# Patient Record
Sex: FEMALE | Race: BLACK OR AFRICAN AMERICAN | NOT HISPANIC OR LATINO | Employment: FULL TIME | URBAN - METROPOLITAN AREA
[De-identification: names, ages, dates, MRNs, and addresses within clinical notes are randomized per-mention and may not be internally consistent; named-entity substitution may affect disease eponyms.]

---

## 2017-01-06 ENCOUNTER — TELEPHONE (OUTPATIENT)
Dept: FAMILY MEDICINE | Facility: CLINIC | Age: 62
End: 2017-01-06

## 2017-01-06 DIAGNOSIS — E78.5 DYSLIPIDEMIA: ICD-10-CM

## 2017-01-06 DIAGNOSIS — I10 ESSENTIAL HYPERTENSION: Primary | ICD-10-CM

## 2017-01-06 NOTE — TELEPHONE ENCOUNTER
----- Message from Stephanie Bonilla sent at 1/6/2017  2:59 PM CST -----  Contact: self/[phone]  Patient would like her blood work orders put in they system for her appt on 02/07/17 and she wants a complete work up of thyroiod and liver enzymes.  Please advise

## 2017-01-06 NOTE — TELEPHONE ENCOUNTER
Pt phoned requesting to get labs done before appt, pt states she had a ct of her heart done with her card dr and her liver looks fatty, requesting liver enzymes as well

## 2017-01-24 RX ORDER — LOSARTAN POTASSIUM AND HYDROCHLOROTHIAZIDE 12.5; 5 MG/1; MG/1
TABLET ORAL
Qty: 90 TABLET | Refills: 1 | Status: SHIPPED | OUTPATIENT
Start: 2017-01-24 | End: 2017-07-01 | Stop reason: SDUPTHER

## 2017-02-06 ENCOUNTER — LAB VISIT (OUTPATIENT)
Dept: LAB | Facility: HOSPITAL | Age: 62
End: 2017-02-06
Attending: FAMILY MEDICINE
Payer: COMMERCIAL

## 2017-02-06 DIAGNOSIS — Z11.59 NEED FOR HEPATITIS C SCREENING TEST: ICD-10-CM

## 2017-02-06 DIAGNOSIS — E78.5 DYSLIPIDEMIA: ICD-10-CM

## 2017-02-06 DIAGNOSIS — I10 ESSENTIAL HYPERTENSION: ICD-10-CM

## 2017-02-06 LAB
ALBUMIN SERPL BCP-MCNC: 3.7 G/DL
ALP SERPL-CCNC: 73 U/L
ALT SERPL W/O P-5'-P-CCNC: 32 U/L
ANION GAP SERPL CALC-SCNC: 9 MMOL/L
AST SERPL-CCNC: 26 U/L
BASOPHILS # BLD AUTO: 0.01 K/UL
BASOPHILS NFR BLD: 0.2 %
BILIRUB SERPL-MCNC: 0.5 MG/DL
BUN SERPL-MCNC: 17 MG/DL
CALCIUM SERPL-MCNC: 9.7 MG/DL
CHLORIDE SERPL-SCNC: 106 MMOL/L
CHOLEST/HDLC SERPL: 4.8 {RATIO}
CO2 SERPL-SCNC: 27 MMOL/L
CREAT SERPL-MCNC: 0.9 MG/DL
DIFFERENTIAL METHOD: NORMAL
EOSINOPHIL # BLD AUTO: 0.3 K/UL
EOSINOPHIL NFR BLD: 4.5 %
ERYTHROCYTE [DISTWIDTH] IN BLOOD BY AUTOMATED COUNT: 14.1 %
EST. GFR  (AFRICAN AMERICAN): >60 ML/MIN/1.73 M^2
EST. GFR  (NON AFRICAN AMERICAN): >60 ML/MIN/1.73 M^2
GLUCOSE SERPL-MCNC: 101 MG/DL
HCT VFR BLD AUTO: 42.2 %
HDL/CHOLESTEROL RATIO: 20.9 %
HDLC SERPL-MCNC: 196 MG/DL
HDLC SERPL-MCNC: 41 MG/DL
HGB BLD-MCNC: 13.7 G/DL
LDLC SERPL CALC-MCNC: 125 MG/DL
LYMPHOCYTES # BLD AUTO: 1.4 K/UL
LYMPHOCYTES NFR BLD: 22.4 %
MCH RBC QN AUTO: 29.9 PG
MCHC RBC AUTO-ENTMCNC: 32.5 %
MCV RBC AUTO: 92 FL
MONOCYTES # BLD AUTO: 0.7 K/UL
MONOCYTES NFR BLD: 10.7 %
NEUTROPHILS # BLD AUTO: 3.9 K/UL
NEUTROPHILS NFR BLD: 62 %
NONHDLC SERPL-MCNC: 155 MG/DL
PLATELET # BLD AUTO: 250 K/UL
PMV BLD AUTO: 10.7 FL
POTASSIUM SERPL-SCNC: 4 MMOL/L
PROT SERPL-MCNC: 7.5 G/DL
RBC # BLD AUTO: 4.58 M/UL
SODIUM SERPL-SCNC: 142 MMOL/L
TRIGL SERPL-MCNC: 150 MG/DL
TSH SERPL DL<=0.005 MIU/L-ACNC: 0.99 UIU/ML
WBC # BLD AUTO: 6.29 K/UL

## 2017-02-06 PROCEDURE — 86803 HEPATITIS C AB TEST: CPT

## 2017-02-06 PROCEDURE — 36415 COLL VENOUS BLD VENIPUNCTURE: CPT | Mod: PO

## 2017-02-06 PROCEDURE — 80061 LIPID PANEL: CPT

## 2017-02-06 PROCEDURE — 85025 COMPLETE CBC W/AUTO DIFF WBC: CPT

## 2017-02-06 PROCEDURE — 84443 ASSAY THYROID STIM HORMONE: CPT

## 2017-02-06 PROCEDURE — 80053 COMPREHEN METABOLIC PANEL: CPT

## 2017-02-07 ENCOUNTER — OFFICE VISIT (OUTPATIENT)
Dept: FAMILY MEDICINE | Facility: CLINIC | Age: 62
End: 2017-02-07
Payer: COMMERCIAL

## 2017-02-07 ENCOUNTER — TELEPHONE (OUTPATIENT)
Dept: FAMILY MEDICINE | Facility: CLINIC | Age: 62
End: 2017-02-07

## 2017-02-07 VITALS
SYSTOLIC BLOOD PRESSURE: 120 MMHG | HEIGHT: 64 IN | HEART RATE: 60 BPM | WEIGHT: 205 LBS | DIASTOLIC BLOOD PRESSURE: 70 MMHG | BODY MASS INDEX: 35 KG/M2

## 2017-02-07 DIAGNOSIS — Z23 NEED FOR INFLUENZA VACCINATION: ICD-10-CM

## 2017-02-07 DIAGNOSIS — E66.01 SEVERE OBESITY (BMI 35.0-39.9): ICD-10-CM

## 2017-02-07 DIAGNOSIS — N39.0 URINARY TRACT INFECTION WITHOUT HEMATURIA, SITE UNSPECIFIED: ICD-10-CM

## 2017-02-07 DIAGNOSIS — K76.0 FATTY LIVER: ICD-10-CM

## 2017-02-07 DIAGNOSIS — I10 ESSENTIAL HYPERTENSION: Primary | ICD-10-CM

## 2017-02-07 DIAGNOSIS — Z23 NEED FOR DIPHTHERIA-TETANUS-PERTUSSIS (TDAP) VACCINE: ICD-10-CM

## 2017-02-07 DIAGNOSIS — N39.0 URINARY TRACT INFECTION WITHOUT HEMATURIA, SITE UNSPECIFIED: Primary | ICD-10-CM

## 2017-02-07 LAB — HCV AB SERPL QL IA: NEGATIVE

## 2017-02-07 PROCEDURE — 90472 IMMUNIZATION ADMIN EACH ADD: CPT | Mod: S$GLB,,, | Performed by: FAMILY MEDICINE

## 2017-02-07 PROCEDURE — 90471 IMMUNIZATION ADMIN: CPT | Mod: S$GLB,,, | Performed by: FAMILY MEDICINE

## 2017-02-07 PROCEDURE — 3074F SYST BP LT 130 MM HG: CPT | Mod: S$GLB,,, | Performed by: FAMILY MEDICINE

## 2017-02-07 PROCEDURE — 90688 IIV4 VACCINE SPLT 0.5 ML IM: CPT | Mod: S$GLB,,, | Performed by: FAMILY MEDICINE

## 2017-02-07 PROCEDURE — 90715 TDAP VACCINE 7 YRS/> IM: CPT | Mod: S$GLB,,, | Performed by: FAMILY MEDICINE

## 2017-02-07 PROCEDURE — 3078F DIAST BP <80 MM HG: CPT | Mod: S$GLB,,, | Performed by: FAMILY MEDICINE

## 2017-02-07 PROCEDURE — 99214 OFFICE O/P EST MOD 30 MIN: CPT | Mod: 25,S$GLB,, | Performed by: FAMILY MEDICINE

## 2017-02-07 PROCEDURE — 99999 PR PBB SHADOW E&M-EST. PATIENT-LVL III: CPT | Mod: PBBFAC,,, | Performed by: FAMILY MEDICINE

## 2017-02-07 RX ORDER — SULFAMETHOXAZOLE AND TRIMETHOPRIM 800; 160 MG/1; MG/1
1 TABLET ORAL 2 TIMES DAILY
Qty: 10 TABLET | Refills: 0 | Status: SHIPPED | OUTPATIENT
Start: 2017-02-07 | End: 2017-02-07

## 2017-02-07 NOTE — PATIENT INSTRUCTIONS
Aerobic Exercise for a Healthy Heart  Exercise is a lot more than an energy booster and a stress reliever. It also strengthens your heart muscle, lowers your blood pressure and cholesterol, and burns calories. It can also improve your resting muscle tone, and your mood.     Remember, some activity is better than none.    Choose an aerobic activity  Choose an activity that makes your heart and lungs work harder than they do when you rest or walk normally. This aerobic exercise can improve the way your heart and other muscles use oxygen. Make it fun by exercising with a friend and choosing an activity you enjoy. Here are some ideas:  · Walking  · Swimming  · Bicycling  · Stair climbing  · Dancing  · Jogging  · Gardening  Exercise regularly  If you havent been exercising regularly,  get your doctors OK first. Then start slowly.  Here are some tips:  · Begin exercising 3 times a week for 5 to 10 minutes at a time.  · When you feel comfortable, add a few minutes each session.  · Slowly build up to exercising 3 to 4 times each week. Each session should last for 40 minutes, on average, and involve moderate- to vigorous-intensity physical activity.  · If you have been given nitroglycerin, be sure to carry it when you exercise.  · If you get chest pain (angina) when youre exercising, stop what youre doing, take your nitroglycerin, and call your doctor.  Date Last Reviewed: 6/2/2016 © 2000-2016 [employer]. [address] [place] [address], [place] [address]. All rights reserved. This information is not intended as a substitute for professional medical care. Always follow your healthcare professional's instructions.      Bandaid applied, tolerated well, pt instructed to wait 15 minutes.

## 2017-02-07 NOTE — MR AVS SNAPSHOT
[facility]   [place]  [place] [address]  Phone: [phone]  Fax: [phone]                  Sharmin Alaniz   2017 3:00 PM   Office Visit    Description:  Female : 1955   Provider:  Jean-Paul Whitfield MD   Department:  [facility]           Reason for Visit     Annual Exam           Diagnoses this Visit        Comments    Essential hypertension    -  Primary     Fatty liver         Severe obesity (BMI 35.0-39.9)         Urinary tract infection without hematuria, site unspecified         Need for diphtheria-tetanus-pertussis (Tdap) vaccine         Need for influenza vaccination                To Do List           Goals (5 Years of Data)     None      Follow-Up and Disposition     Return in about 6 months (around 2017), or if symptoms worsen or fail to improve.      [facility] On Call     [facility] On Call Nurse Care Line -  Assistance  Registered nurses in the [facility] On Call Center provide clinical advisement, health education, appointment booking, and other advisory services.  Call for this free service at [phone].             Medications           Message regarding Medications     Verify the changes and/or additions to your medication regime listed below are the same as discussed with your clinician today.  If any of these changes or additions are incorrect, please notify your healthcare provider.        STOP taking these medications     sulfamethoxazole-trimethoprim 800-160mg (BACTRIM DS) 800-160 mg Tab Take 1 tablet by mouth 2 (two) times daily.           Verify that the below list of medications is an accurate representation of the medications you are currently taking.  If none reported, the list may be blank. If incorrect, please contact your healthcare provider. Carry this list with you in case of emergency.           Current Medications     cetirizine (ZYRTEC) 10 MG tablet Take 1 tablet (10 mg total) by mouth once daily.     "conjugated estrogens (PREMARIN) vaginal cream Place vaginally once daily.      fluticasone (FLONASE) 50 mcg/actuation nasal spray 2 sprays by Each Nare route daily as needed.    losartan-hydrochlorothiazide 50-12.5 mg (HYZAAR) 50-12.5 mg per tablet TAKE 1 TABLET BY MOUTH ONCE DAILY.    OMEPRAZOLE/SODIUM BICARBONATE (ZEGERID ORAL) Take by mouth.           Clinical Reference Information           Your Vitals Were     BP Pulse Height Weight BMI    120/70 (BP Location: Right arm, Patient Position: Sitting, BP Method: Manual) 60 5' 4" (1.626 m) 93 kg (205 lb 0.4 oz) 35.19 kg/m2      Blood Pressure          Most Recent Value    BP  120/70      Allergies as of 2/7/2017     Ciprofloxacin      Immunizations Administered on Date of Encounter - 2/7/2017     Name Date Dose VIS Date Route    TDAP  Incomplete 0.5 mL 2/24/2015 Intramuscular    influenza - Quadrivalent  Incomplete 0.5 mL 8/7/2015 Intramuscular      Orders Placed During Today's Visit      Normal Orders This Visit    Influenza - Quadrivalent (3 years & older)     Tdap Vaccine (Adult)       Instructions      Aerobic Exercise for a Healthy Heart  Exercise is a lot more than an energy booster and a stress reliever. It also strengthens your heart muscle, lowers your blood pressure and cholesterol, and burns calories. It can also improve your resting muscle tone, and your mood.     Remember, some activity is better than none.    Choose an aerobic activity  Choose an activity that makes your heart and lungs work harder than they do when you rest or walk normally. This aerobic exercise can improve the way your heart and other muscles use oxygen. Make it fun by exercising with a friend and choosing an activity you enjoy. Here are some ideas:  · Walking  · Swimming  · Bicycling  · Stair climbing  · Dancing  · Jogging  · Gardening  Exercise regularly  If you havent been exercising regularly,  get your doctors OK first. Then start slowly.  Here are some tips:  · Begin " exercising 3 times a week for 5 to 10 minutes at a time.  · When you feel comfortable, add a few minutes each session.  · Slowly build up to exercising 3 to 4 times each week. Each session should last for 40 minutes, on average, and involve moderate- to vigorous-intensity physical activity.  · If you have been given nitroglycerin, be sure to carry it when you exercise.  · If you get chest pain (angina) when youre exercising, stop what youre doing, take your nitroglycerin, and call your doctor.  Date Last Reviewed: 6/2/2016  © 8237-1188 [employer]. [address] [place] [address], [place] [address]. All rights reserved. This information is not intended as a substitute for professional medical care. Always follow your healthcare professional's instructions.             Language Assistance Services     ATTENTION: Language assistance services are available, free of charge. Please call [phone].      ATENCIÓN: Si habla myron, tiene a romero disposición servicios gratuitos de asistencia lingüística. Llame al [phone].     MEE Ý: N?u b?n nói Ti?ng Vi?t, có các d?ch v? h? tr? ngôn ng? mi?n phí dành cho b?n. G?i s? [phone].         [facility] complies with applicable Federal civil rights laws and does not discriminate on the basis of race, color, national origin, age, disability, or sex.

## 2017-02-07 NOTE — PROGRESS NOTES
Subjective:       Patient ID: Sharmin Alaniz is a 61 y.o. female.    Chief Complaint: Annual Exam    HPI Comments: 61 years old who came to the clinic for blood pressure check.  Blood pressure today is stable.  No chest pain palpitations orthopnea or PND.  Patient was recently diagnosed with fatty liver.  Patient is obese currently trying to lose weight.  Patient BMI 35.  Urine with evidence of acute UTI.    Review of Systems   Constitutional: Negative.  Negative for chills and fever.   HENT: Negative.    Eyes: Negative.    Respiratory: Negative.    Cardiovascular: Negative.  Negative for chest pain, palpitations and leg swelling.   Gastrointestinal: Negative.    Genitourinary: Negative.  Negative for dysuria, flank pain, frequency, hematuria and urgency.   Musculoskeletal: Negative.    Skin: Negative.    Neurological: Negative.    Psychiatric/Behavioral: Negative.        Objective:      Physical Exam   Constitutional: She is oriented to person, place, and time. She appears well-developed and well-nourished. No distress.   HENT:   Head: Normocephalic and atraumatic.   Right Ear: External ear normal.   Left Ear: External ear normal.   Nose: Nose normal.   Mouth/Throat: Oropharynx is clear and moist. No oropharyngeal exudate.   Eyes: Conjunctivae and EOM are normal. Pupils are equal, round, and reactive to light. Right eye exhibits no discharge. Left eye exhibits no discharge. No scleral icterus.   Neck: Normal range of motion. Neck supple. No JVD present. No tracheal deviation present. No thyromegaly present.   Cardiovascular: Normal rate, regular rhythm, normal heart sounds and intact distal pulses.  Exam reveals no gallop and no friction rub.    No murmur heard.  Pulmonary/Chest: Effort normal and breath sounds normal. No stridor. No respiratory distress. She has no wheezes. She has no rales. She exhibits no tenderness.   Abdominal: Soft. Bowel sounds are normal. She exhibits no distension and no mass. There is no  tenderness. There is no rebound and no guarding.   Musculoskeletal: Normal range of motion. She exhibits no edema or tenderness.   Lymphadenopathy:     She has no cervical adenopathy.   Neurological: She is alert and oriented to person, place, and time. She has normal reflexes. No cranial nerve deficit. She exhibits normal muscle tone. Coordination normal.   Skin: Skin is warm and dry. No rash noted. She is not diaphoretic. No erythema. No pallor.   Psychiatric: She has a normal mood and affect. Her behavior is normal. Judgment and thought content normal.       Assessment:       1. Essential hypertension    2. Fatty liver    3. Severe obesity (BMI 35.0-39.9)    4. Urinary tract infection without hematuria, site unspecified    5. Need for diphtheria-tetanus-pertussis (Tdap) vaccine    6. Need for influenza vaccination        Plan:         Sharmin was seen today for annual exam.    Diagnoses and all orders for this visit:    Essential hypertension    Fatty liver    Severe obesity (BMI 35.0-39.9)    Urinary tract infection without hematuria, site unspecified    Need for diphtheria-tetanus-pertussis (Tdap) vaccine  -     Tdap Vaccine (Adult)    Need for influenza vaccination  -     Influenza - Quadrivalent (3 years & older)    Continue monitoring blood pressure at home, low sodium diet.   Diet and physical activity to promote weight loss.

## 2017-05-11 ENCOUNTER — OFFICE VISIT (OUTPATIENT)
Dept: OPTOMETRY | Facility: CLINIC | Age: 62
End: 2017-05-11
Payer: COMMERCIAL

## 2017-05-11 DIAGNOSIS — H25.13 NUCLEAR SCLEROSIS, BILATERAL: Primary | ICD-10-CM

## 2017-05-11 DIAGNOSIS — Z46.0 FITTING AND ADJUSTMENT OF SPECTACLES AND CONTACT LENSES: Primary | ICD-10-CM

## 2017-05-11 PROCEDURE — 92310 CONTACT LENS FITTING OU: CPT | Mod: ,,, | Performed by: OPTOMETRIST

## 2017-05-11 PROCEDURE — 92014 COMPRE OPH EXAM EST PT 1/>: CPT | Mod: S$GLB,,, | Performed by: OPTOMETRIST

## 2017-05-11 PROCEDURE — 99999 PR PBB SHADOW E&M-EST. PATIENT-LVL II: CPT | Mod: PBBFAC,,, | Performed by: OPTOMETRIST

## 2017-05-11 PROCEDURE — 92015 DETERMINE REFRACTIVE STATE: CPT | Mod: S$GLB,,, | Performed by: OPTOMETRIST

## 2017-05-11 PROCEDURE — 99999 PR PBB SHADOW E&M-EST. PATIENT-LVL I: CPT | Mod: PBBFAC,,, | Performed by: OPTOMETRIST

## 2017-05-11 NOTE — PROGRESS NOTES
HPI     DLS: 3/2/2016  Pt states no vision changes since last visit. +Dry eyes  +Itching   Denies f/f    Pt states she is interested in trying CL.     Refresh ou PRN         Last edited by Lore Hodgson on 5/11/2017  2:59 PM.     ROS     Positive for: Eyes    Negative for: Constitutional, Gastrointestinal, Neurological, Skin,   Genitourinary, Musculoskeletal, HENT, Endocrine, Cardiovascular,   Respiratory, Psychiatric, Allergic/Imm, Heme/Lymph    Last edited by Cm Lehman, OD on 5/11/2017  3:10 PM. (History)        Assessment /Plan     For exam results, see Encounter Report.    Nuclear sclerosis, bilateral      1. Small Cats OU  2. Pt wore OASYS ASTIG CLs 5 years ago--gave up due to dryness.  Wishes to try again.  Will try diff brand to see if more comfort.  Good fit w OASYS ASTIG (did not have soria in stock pt needs)    PLAN:    1. Sent pt to optical to order TRIALS OASYS ASTIG monovision (OS NEAR).  i do NOT need to see at disp  2. Build up wearing time.  Use frequent REFRESH ATs  3. Continue Daily Wear schedule.  NO SLEEPING IN CONTACT LENSES. Clean and disinfect nightly.  exchange monthly.    4. If no problems will call for CLRx, rtc 1 yr

## 2017-07-03 RX ORDER — LOSARTAN POTASSIUM AND HYDROCHLOROTHIAZIDE 12.5; 5 MG/1; MG/1
TABLET ORAL
Qty: 90 TABLET | Refills: 1 | Status: SHIPPED | OUTPATIENT
Start: 2017-07-03 | End: 2017-12-09 | Stop reason: SDUPTHER

## 2017-12-11 RX ORDER — LOSARTAN POTASSIUM AND HYDROCHLOROTHIAZIDE 12.5; 5 MG/1; MG/1
TABLET ORAL
Qty: 90 TABLET | Refills: 1 | Status: SHIPPED | OUTPATIENT
Start: 2017-12-11 | End: 2018-05-24 | Stop reason: SDUPTHER

## 2017-12-20 ENCOUNTER — PATIENT MESSAGE (OUTPATIENT)
Dept: ADMINISTRATIVE | Facility: OTHER | Age: 62
End: 2017-12-20

## 2018-03-02 ENCOUNTER — TELEPHONE (OUTPATIENT)
Dept: FAMILY MEDICINE | Facility: CLINIC | Age: 63
End: 2018-03-02

## 2018-03-02 DIAGNOSIS — E66.01 SEVERE OBESITY (BMI 35.0-39.9): ICD-10-CM

## 2018-03-02 DIAGNOSIS — I10 ESSENTIAL HYPERTENSION: Primary | ICD-10-CM

## 2018-03-02 NOTE — TELEPHONE ENCOUNTER
----- Message from Rocio Aggarwal sent at 3/2/2018  8:51 AM CST -----  Contact: Self. [phone]  Patient would like blood work orders put in the system. Please advise.

## 2018-03-10 ENCOUNTER — LAB VISIT (OUTPATIENT)
Dept: LAB | Facility: HOSPITAL | Age: 63
End: 2018-03-10
Attending: FAMILY MEDICINE
Payer: COMMERCIAL

## 2018-03-10 DIAGNOSIS — E66.01 SEVERE OBESITY (BMI 35.0-39.9): ICD-10-CM

## 2018-03-10 DIAGNOSIS — I10 ESSENTIAL HYPERTENSION: ICD-10-CM

## 2018-03-10 LAB
ALBUMIN SERPL BCP-MCNC: 3.8 G/DL
ALP SERPL-CCNC: 70 U/L
ALT SERPL W/O P-5'-P-CCNC: 29 U/L
ANION GAP SERPL CALC-SCNC: 9 MMOL/L
AST SERPL-CCNC: 23 U/L
BASOPHILS # BLD AUTO: 0.02 K/UL
BASOPHILS NFR BLD: 0.4 %
BILIRUB SERPL-MCNC: 0.6 MG/DL
BUN SERPL-MCNC: 22 MG/DL
CALCIUM SERPL-MCNC: 10.2 MG/DL
CHLORIDE SERPL-SCNC: 106 MMOL/L
CHOLEST SERPL-MCNC: 211 MG/DL
CHOLEST/HDLC SERPL: 4.4 {RATIO}
CO2 SERPL-SCNC: 28 MMOL/L
CREAT SERPL-MCNC: 0.9 MG/DL
DIFFERENTIAL METHOD: ABNORMAL
EOSINOPHIL # BLD AUTO: 0.3 K/UL
EOSINOPHIL NFR BLD: 4.8 %
ERYTHROCYTE [DISTWIDTH] IN BLOOD BY AUTOMATED COUNT: 13.5 %
EST. GFR  (AFRICAN AMERICAN): >60 ML/MIN/1.73 M^2
EST. GFR  (NON AFRICAN AMERICAN): >60 ML/MIN/1.73 M^2
GLUCOSE SERPL-MCNC: 102 MG/DL
HCT VFR BLD AUTO: 42.6 %
HDLC SERPL-MCNC: 48 MG/DL
HDLC SERPL: 22.7 %
HGB BLD-MCNC: 13.5 G/DL
IMM GRANULOCYTES # BLD AUTO: 0.03 K/UL
IMM GRANULOCYTES NFR BLD AUTO: 0.6 %
LDLC SERPL CALC-MCNC: 139.6 MG/DL
LYMPHOCYTES # BLD AUTO: 1.1 K/UL
LYMPHOCYTES NFR BLD: 20.6 %
MCH RBC QN AUTO: 29.2 PG
MCHC RBC AUTO-ENTMCNC: 31.7 G/DL
MCV RBC AUTO: 92 FL
MONOCYTES # BLD AUTO: 0.5 K/UL
MONOCYTES NFR BLD: 9.7 %
NEUTROPHILS # BLD AUTO: 3.4 K/UL
NEUTROPHILS NFR BLD: 63.9 %
NONHDLC SERPL-MCNC: 163 MG/DL
NRBC BLD-RTO: 0 /100 WBC
PLATELET # BLD AUTO: 249 K/UL
PMV BLD AUTO: 10.3 FL
POTASSIUM SERPL-SCNC: 4.2 MMOL/L
PROT SERPL-MCNC: 7.4 G/DL
RBC # BLD AUTO: 4.63 M/UL
SODIUM SERPL-SCNC: 143 MMOL/L
TRIGL SERPL-MCNC: 117 MG/DL
TSH SERPL DL<=0.005 MIU/L-ACNC: 0.74 UIU/ML
WBC # BLD AUTO: 5.25 K/UL

## 2018-03-10 PROCEDURE — 84443 ASSAY THYROID STIM HORMONE: CPT

## 2018-03-10 PROCEDURE — 80053 COMPREHEN METABOLIC PANEL: CPT

## 2018-03-10 PROCEDURE — 85025 COMPLETE CBC W/AUTO DIFF WBC: CPT

## 2018-03-10 PROCEDURE — 36415 COLL VENOUS BLD VENIPUNCTURE: CPT | Mod: PO

## 2018-03-10 PROCEDURE — 80061 LIPID PANEL: CPT

## 2018-03-12 ENCOUNTER — TELEPHONE (OUTPATIENT)
Dept: FAMILY MEDICINE | Facility: CLINIC | Age: 63
End: 2018-03-12

## 2018-03-12 DIAGNOSIS — N39.0 URINARY TRACT INFECTION WITHOUT HEMATURIA, SITE UNSPECIFIED: Primary | ICD-10-CM

## 2018-03-12 RX ORDER — SULFAMETHOXAZOLE AND TRIMETHOPRIM 800; 160 MG/1; MG/1
1 TABLET ORAL 2 TIMES DAILY
Qty: 6 TABLET | Refills: 0 | Status: SHIPPED | OUTPATIENT
Start: 2018-03-12 | End: 2018-03-15

## 2018-03-13 ENCOUNTER — OFFICE VISIT (OUTPATIENT)
Dept: FAMILY MEDICINE | Facility: CLINIC | Age: 63
End: 2018-03-13
Payer: COMMERCIAL

## 2018-03-13 VITALS
HEART RATE: 58 BPM | WEIGHT: 205 LBS | HEIGHT: 64 IN | DIASTOLIC BLOOD PRESSURE: 68 MMHG | SYSTOLIC BLOOD PRESSURE: 120 MMHG | BODY MASS INDEX: 35 KG/M2

## 2018-03-13 DIAGNOSIS — E78.5 DYSLIPIDEMIA: ICD-10-CM

## 2018-03-13 DIAGNOSIS — E66.01 SEVERE OBESITY (BMI 35.0-39.9): ICD-10-CM

## 2018-03-13 DIAGNOSIS — I10 ESSENTIAL HYPERTENSION: Primary | ICD-10-CM

## 2018-03-13 PROCEDURE — 99214 OFFICE O/P EST MOD 30 MIN: CPT | Mod: S$GLB,,, | Performed by: FAMILY MEDICINE

## 2018-03-13 PROCEDURE — 99999 PR PBB SHADOW E&M-EST. PATIENT-LVL III: CPT | Mod: PBBFAC,,, | Performed by: FAMILY MEDICINE

## 2018-03-13 PROCEDURE — 3074F SYST BP LT 130 MM HG: CPT | Mod: CPTII,S$GLB,, | Performed by: FAMILY MEDICINE

## 2018-03-13 PROCEDURE — 3078F DIAST BP <80 MM HG: CPT | Mod: CPTII,S$GLB,, | Performed by: FAMILY MEDICINE

## 2018-03-13 NOTE — PROGRESS NOTES
Subjective:       Patient ID: Sharmin Alaniz is a 62 y.o. female.    Chief Complaint: Annual Exam    62 years old female who came to the clinic for her physical examination.  Patient was slightly elevated cholesterol.  No chest pain palpitations orthopnea or PND.  Blood pressure today stable.  Patient with a BMI of 35 currently trying to lose weight.      Review of Systems   Constitutional: Negative.  Negative for activity change and unexpected weight change.   HENT: Negative.  Negative for hearing loss, rhinorrhea and trouble swallowing.    Eyes: Negative.  Negative for discharge and visual disturbance.   Respiratory: Negative.  Negative for chest tightness and wheezing.    Cardiovascular: Negative.  Negative for chest pain and palpitations.   Gastrointestinal: Negative.  Negative for blood in stool, constipation, diarrhea and vomiting.   Endocrine: Negative for polydipsia and polyuria.   Genitourinary: Negative.  Negative for difficulty urinating, dysuria, hematuria and menstrual problem.   Musculoskeletal: Negative.  Negative for arthralgias, joint swelling and neck pain.   Skin: Negative.    Neurological: Negative.  Negative for weakness and headaches.   Psychiatric/Behavioral: Negative.  Negative for confusion and dysphoric mood.       Objective:      Physical Exam   Constitutional: She is oriented to person, place, and time. She appears well-developed and well-nourished. No distress.   HENT:   Head: Normocephalic and atraumatic.   Right Ear: External ear normal.   Left Ear: External ear normal.   Nose: Nose normal.   Mouth/Throat: Oropharynx is clear and moist. No oropharyngeal exudate.   Eyes: Conjunctivae and EOM are normal. Pupils are equal, round, and reactive to light. Right eye exhibits no discharge. Left eye exhibits no discharge. No scleral icterus.   Neck: Normal range of motion. Neck supple. No JVD present. No tracheal deviation present. No thyromegaly present.   Cardiovascular: Normal rate, regular  rhythm, normal heart sounds and intact distal pulses.  Exam reveals no gallop and no friction rub.    No murmur heard.  Pulmonary/Chest: Effort normal and breath sounds normal. No stridor. No respiratory distress. She has no wheezes. She has no rales. She exhibits no tenderness.   Abdominal: Soft. Bowel sounds are normal. She exhibits no distension and no mass. There is no tenderness. There is no rebound and no guarding.   Musculoskeletal: Normal range of motion. She exhibits no edema or tenderness.   Lymphadenopathy:     She has no cervical adenopathy.   Neurological: She is alert and oriented to person, place, and time. She has normal reflexes. No cranial nerve deficit. She exhibits normal muscle tone. Coordination normal.   Skin: Skin is warm and dry. No rash noted. She is not diaphoretic. No erythema. No pallor.   Psychiatric: She has a normal mood and affect. Her behavior is normal. Judgment and thought content normal.       Assessment:       1. Essential hypertension    2. Severe obesity (BMI 35.0-39.9)    3. Dyslipidemia        Plan:         Sharmin was seen today for annual exam.    Diagnoses and all orders for this visit:    Essential hypertension  -     Comprehensive metabolic panel; Future  -     Lipid panel; Future  -     Ambulatory Referral to [facility] ([place])  -     [facility] ASSIGN QUESTIONNAIRE SERIES ([place])  -     [facility] ([place]    Severe obesity (BMI 35.0-39.9)  -     Lipid panel; Future  -     Ambulatory Referral to [facility] ([place])  -     [facility] ASSIGN QUESTIONNAIRE SERIES ([place])  -     [facility] ([place])    Dyslipidemia  -     Comprehensive metabolic panel; Future  -     Lipid panel; Future  -     Ambulatory Referral to [facility] ([place])  -     [facility] IsofluxHART ASSIGN QUESTIONNAIRE SERIES ([place])  -     [facility] ([place])    Continue monitoring blood pressure at home, low  sodium diet.   Diet and physical activity to promote weight loss.

## 2018-03-13 NOTE — PATIENT INSTRUCTIONS
"  Understanding Body Mass Index (BMI)  Body mass index (BMI) is a method of screening for a weight category using the ratio of your height to your weight. The BMI is a measure of overweight that is corrected for height. Knowing your BMI is a way to tell if you are at a healthy weight. The higher your BMI, the greater your risk for weight-related health problems.  What BMI means  · BMI below 18.5: Underweight  · BMI 18.5 to 24.9: Healthy weight or "ideal body weight"   · BMI 25 to 29.9: Overweight  · BMI 30 and over: Obese  · BMI 40 and over: Severe obesity   Online BMI Calculators  Find your BMI with an online BMI calculator tool, such as these from the CDC:  · BMI calculator for adults  · BMI calculator for children and teens   Using the BMI chart  To figure out your BMI, find your height and weight (or the numbers closest to them) on the table below. Follow each column of numbers to where your height and weight meet on the table. That is your BMI.    Date Last Reviewed: 7/1/2016  © 8426-6292 [employer]. [address] [place] [address], [place] [address]. All rights reserved. This information is not intended as a substitute for professional medical care. Always follow your healthcare professional's instructions.        "

## 2018-03-15 ENCOUNTER — TELEPHONE (OUTPATIENT)
Dept: FAMILY MEDICINE | Facility: CLINIC | Age: 63
End: 2018-03-15

## 2018-03-15 DIAGNOSIS — R22.2 SUPRACLAVICULAR MASS: Primary | ICD-10-CM

## 2018-03-15 NOTE — TELEPHONE ENCOUNTER
Patient concern about her mass on her neck that spoke with Dr France on her last visit.  She will like to have further testing for this.  Please advise.

## 2018-03-15 NOTE — TELEPHONE ENCOUNTER
Spoke with patient about CT neck was ordered by Dr Fracne.  A referral coordinator will be calling patient to set appointment for this test.  Patient verbalized understanding on instruction given.

## 2018-03-15 NOTE — TELEPHONE ENCOUNTER
Patient with soft tissue supraclavicular mass on the left side.  Imaging study was ordered.    Please contact the patient with appointment.

## 2018-03-15 NOTE — TELEPHONE ENCOUNTER
----- Message from Misbah Bentley sent at 3/15/2018  8:42 AM CDT -----  Contact: [phone] self  Patient requested to speak with the nurse about the soft tissue mass on her neck. Please call and advise.

## 2018-03-23 ENCOUNTER — TELEPHONE (OUTPATIENT)
Dept: FAMILY MEDICINE | Facility: CLINIC | Age: 63
End: 2018-03-23

## 2018-03-23 DIAGNOSIS — R22.1 NECK MASS: ICD-10-CM

## 2018-03-27 ENCOUNTER — HOSPITAL ENCOUNTER (OUTPATIENT)
Dept: RADIOLOGY | Facility: HOSPITAL | Age: 63
Discharge: HOME | End: 2018-03-27
Attending: FAMILY MEDICINE
Payer: COMMERCIAL

## 2018-03-27 DIAGNOSIS — R22.1 NECK MASS: ICD-10-CM

## 2018-03-27 DIAGNOSIS — R22.2 SUPRACLAVICULAR MASS: ICD-10-CM

## 2018-03-27 PROCEDURE — 25500020 PHARM REV CODE 255: Mod: PO | Performed by: FAMILY MEDICINE

## 2018-03-27 PROCEDURE — 70491 CT SOFT TISSUE NECK W/DYE: CPT | Mod: TC,PO

## 2018-03-27 PROCEDURE — 71260 CT THORAX DX C+: CPT | Mod: TC,PO

## 2018-03-27 RX ADMIN — IOHEXOL 50 ML: 350 INJECTION, SOLUTION INTRAVENOUS at 08:03

## 2018-04-21 ENCOUNTER — OFFICE VISIT (OUTPATIENT)
Dept: URGENT CARE | Facility: CLINIC | Age: 63
End: 2018-04-21
Payer: COMMERCIAL

## 2018-04-21 VITALS
SYSTOLIC BLOOD PRESSURE: 129 MMHG | BODY MASS INDEX: 35 KG/M2 | WEIGHT: 205 LBS | TEMPERATURE: 99 F | DIASTOLIC BLOOD PRESSURE: 70 MMHG | HEART RATE: 89 BPM | OXYGEN SATURATION: 97 % | RESPIRATION RATE: 18 BRPM | HEIGHT: 64 IN

## 2018-04-21 DIAGNOSIS — J32.9 SINUSITIS, UNSPECIFIED CHRONICITY, UNSPECIFIED LOCATION: Primary | ICD-10-CM

## 2018-04-21 DIAGNOSIS — J30.9 CHRONIC ALLERGIC RHINITIS, UNSPECIFIED SEASONALITY, UNSPECIFIED TRIGGER: ICD-10-CM

## 2018-04-21 PROCEDURE — 99214 OFFICE O/P EST MOD 30 MIN: CPT | Mod: 25,S$GLB,, | Performed by: FAMILY MEDICINE

## 2018-04-21 PROCEDURE — 3078F DIAST BP <80 MM HG: CPT | Mod: CPTII,S$GLB,, | Performed by: FAMILY MEDICINE

## 2018-04-21 PROCEDURE — 96372 THER/PROPH/DIAG INJ SC/IM: CPT | Mod: S$GLB,,, | Performed by: FAMILY MEDICINE

## 2018-04-21 PROCEDURE — 3074F SYST BP LT 130 MM HG: CPT | Mod: CPTII,S$GLB,, | Performed by: FAMILY MEDICINE

## 2018-04-21 RX ORDER — FLUTICASONE PROPIONATE 50 MCG
2 SPRAY, SUSPENSION (ML) NASAL DAILY
Qty: 1 BOTTLE | Refills: 8 | Status: SHIPPED | OUTPATIENT
Start: 2018-04-21

## 2018-04-21 RX ORDER — AMOXICILLIN AND CLAVULANATE POTASSIUM 875; 125 MG/1; MG/1
1 TABLET, FILM COATED ORAL 2 TIMES DAILY
Qty: 20 TABLET | Refills: 0 | Status: SHIPPED | OUTPATIENT
Start: 2018-04-21 | End: 2018-05-01

## 2018-04-21 RX ORDER — BETAMETHASONE SODIUM PHOSPHATE AND BETAMETHASONE ACETATE 3; 3 MG/ML; MG/ML
6 INJECTION, SUSPENSION INTRA-ARTICULAR; INTRALESIONAL; INTRAMUSCULAR; SOFT TISSUE ONCE
Status: COMPLETED | OUTPATIENT
Start: 2018-04-21 | End: 2018-04-21

## 2018-04-21 RX ADMIN — BETAMETHASONE SODIUM PHOSPHATE AND BETAMETHASONE ACETATE 6 MG: 3; 3 INJECTION, SUSPENSION INTRA-ARTICULAR; INTRALESIONAL; INTRAMUSCULAR; SOFT TISSUE at 04:04

## 2018-04-21 NOTE — PATIENT INSTRUCTIONS
Understanding Nasal Allergies  Nasal allergies (also called allergic rhinitis) are a common health problem. They may be seasonal. This means they cause symptoms only at certain times of the year. Or they may be perennial. This means they cause symptoms all year long. Other health problems, such as asthma, often occur along with allergies as well.    What is an allergic reaction?  An allergy is a reaction to a substance called an allergen. Common allergens include:  · Wind-borne pollen  · Mold  · Dust mites  · Furry and feathered animals  · Cockroaches  Normally, allergens are harmless. But when a person has allergies, the body thinks they are harmful. The body then attacks allergens with antibodies. Antibodies are attached to special cells called mast cells. Allergens stick to the antibodies. This makes the mast cells release histamine and other chemicals. This is an allergic reaction. The chemicals irritate nearby nasal tissue. This causes nasal allergy symptoms.  Common nasal allergy symptoms  Allergies can cause nasal tissue to swell. This makes the air passages smaller. The nose may feel stuffed up. The nose may also make extra mucus, which can plug the nasal passages or drip out of the nose. Mucus can drip down the back of the throat (postnasal drip) as well. Sinus tissue can swell. This may cause pain and headache. Common allergy symptoms include:  · Runny nose with clear, watery discharge  · Stuffy nose (nasal congestion)  · Drainage down your throat (postnasal drip)  · Sneezing  · Red, watery eyes  · Itchy nose, eyes, ears, and throat  · Plugged-up ears (ear congestion)  · Sore throat  · Coughing  · Sinus pain and swelling  · Headache  It may not be allergies  Other health problems can cause symptoms like those of nasal allergies. These include:  · Nonallergic rhinitis and viruses such as colds  · Irritants and pollutants, such as strong odors or smoke  · Certain medicines  · Changes in the weather    Treatment  Your healthcare provider will evaluate you to find the cause of your symptoms then recommend treatment. If your symptoms are due to nasal allergies, your healthcare provider may prescribe nasal steroid sprays or oral antihistamines to help reduce symptoms. Avoidance of the allergen will also be suggested. You may also be referred to an allergist.   Date Last Reviewed: 10/1/2016  © 6242-5388 [employer]. [address] [place] [address]. All rights reserved. This information is not intended as a substitute for professional medical care. Always follow your healthcare professional's instructions.        Understanding Your Sinuses  Your sinuses are air-filled spaces between the bones in your head. They have small openings that connect to the nasal cavity. The sinuses make mucus that drains into the nose. This helps keep the nose moist and free of dust and germs.      Parts of the nasal cavity  · The septum is the wall of cartilage and bone in the center of the nasal cavity.  · The middle meatus is the intersection between the sinuses.  · Turbinates are ridges on the sides of the nasal cavity.  Cilia keep sinuses clear    Air circulates freely though healthy sinuses. Tiny, hairlike structures called cilia line the sinuses. Cilia move the thin, watery mucus through the sinuses and into the nose. Sinuses are healthy when they drain freely. Sinus drainage can be blocked if the sinus lining is swollen or if mucus is too thick. Cilia that are damaged or dont work correctly can also lead to problems with drainage.  Date Last Reviewed: 10/1/2016  © 2206-9334 [employer]. [address] [place] [address]. All rights reserved. This information is not intended as a substitute for professional medical care. Always follow your healthcare professional's instructions.      Continue to use the Flonase even after you complete the antibiotic.  Using the Flonase on a regular  basis may help prevent sinus infections by keeping the allergic rhinitis under control.    Make sure that you follow up with your primary care doctor in the next 2-5 days if needed .  Return to the Urgent Care if signs or symptoms change and certainly if you have worsening symptoms go to the nearest emergency department for further evaluation.

## 2018-04-21 NOTE — PROGRESS NOTES
"Subjective:       Patient ID: Sharmin Alaniz is a 62 y.o. female.    Vitals:  height is 5' 4" (1.626 m) and weight is 93 kg (205 lb). Her temperature is 98.9 °F (37.2 °C). Her blood pressure is 129/70 and her pulse is 89. Her respiration is 18 and oxygen saturation is 97%.     Chief Complaint: Cough (Sore throat)    4 days of irritated throat and cough.  Long hx of allergic rhinitis.  Using Allegra now. Not using Flonase. Yellow /green nasal drainage. No fever      Cough   This is a new problem. The current episode started in the past 7 days. The problem has been gradually worsening. The cough is productive of sputum. Associated symptoms include ear pain, nasal congestion, postnasal drip and a sore throat. Pertinent negatives include no chest pain, chills, eye redness, fever, headaches, myalgias, shortness of breath or wheezing. Treatments tried: allegra. The treatment provided no relief.     Review of Systems   Constitution: Negative for chills, fever and malaise/fatigue.   HENT: Positive for congestion, ear pain, postnasal drip and sore throat. Negative for hoarse voice.    Eyes: Negative for discharge and redness.   Cardiovascular: Negative for chest pain, dyspnea on exertion and leg swelling.   Respiratory: Positive for cough and sputum production. Negative for shortness of breath and wheezing.    Musculoskeletal: Negative for myalgias.   Gastrointestinal: Negative for abdominal pain and nausea.   Neurological: Negative for headaches.       Objective:      Physical Exam   Constitutional: She is oriented to person, place, and time. She appears well-developed and well-nourished. She is cooperative.  Non-toxic appearance. She does not appear ill. No distress.   HENT:   Head: Normocephalic and atraumatic.   Right Ear: Hearing, tympanic membrane, external ear and ear canal normal.   Left Ear: Hearing, tympanic membrane, external ear and ear canal normal.   Nose: Nose normal. No mucosal edema, rhinorrhea or nasal " deformity. No epistaxis. Right sinus exhibits no maxillary sinus tenderness and no frontal sinus tenderness. Left sinus exhibits no maxillary sinus tenderness and no frontal sinus tenderness.   Mouth/Throat: Uvula is midline, oropharynx is clear and moist and mucous membranes are normal. No trismus in the jaw. Normal dentition. No uvula swelling. No posterior oropharyngeal erythema.   Left tm slightly injected.  Right tm clear.  Minimal max sinus tenderness.  No frontal sinus tenderness.   Eyes: Conjunctivae and lids are normal. No scleral icterus.   Sclera clear bilat   Neck: Trachea normal, full passive range of motion without pain and phonation normal. Neck supple.   Cardiovascular: Normal rate, regular rhythm, normal heart sounds, intact distal pulses and normal pulses.    Pulmonary/Chest: Effort normal and breath sounds normal. No respiratory distress. She has no wheezes. She has no rales.   Abdominal: Soft. Normal appearance and bowel sounds are normal. She exhibits no distension. There is no tenderness.   Musculoskeletal: Normal range of motion. She exhibits no edema or deformity.   Neurological: She is alert and oriented to person, place, and time. She exhibits normal muscle tone. Coordination normal.   Skin: Skin is warm, dry and intact. She is not diaphoretic. No pallor.   Psychiatric: She has a normal mood and affect. Her speech is normal and behavior is normal. Judgment and thought content normal. Cognition and memory are normal.   Nursing note and vitals reviewed.      Assessment:       1. Sinusitis, unspecified chronicity, unspecified location    2. Chronic allergic rhinitis, unspecified seasonality, unspecified trigger        Plan:         Prescribed Augmentin 875mg BID for 10days, Flonase 2 inhalations each nostril Q day (with refills), and received Celestone 6mg injection in office today.    Continue to use the Flonase even after you complete the antibiotic.  Using the Flonase on a regular basis may  help prevent sinus infections by keeping the allergic rhinitis under control.

## 2018-04-24 ENCOUNTER — TELEPHONE (OUTPATIENT)
Dept: URGENT CARE | Facility: CLINIC | Age: 63
End: 2018-04-24

## 2018-05-24 RX ORDER — LOSARTAN POTASSIUM AND HYDROCHLOROTHIAZIDE 12.5; 5 MG/1; MG/1
TABLET ORAL
Qty: 90 TABLET | Refills: 1 | Status: SHIPPED | OUTPATIENT
Start: 2018-05-24 | End: 2018-11-08 | Stop reason: SDUPTHER

## 2018-06-26 ENCOUNTER — OFFICE VISIT (OUTPATIENT)
Dept: OPTOMETRY | Facility: CLINIC | Age: 63
End: 2018-06-26
Payer: COMMERCIAL

## 2018-06-26 DIAGNOSIS — H25.13 NUCLEAR SCLEROSIS, BILATERAL: Primary | ICD-10-CM

## 2018-06-26 DIAGNOSIS — H04.123 DRY EYES, BILATERAL: ICD-10-CM

## 2018-06-26 DIAGNOSIS — H52.4 PRESBYOPIA: ICD-10-CM

## 2018-06-26 PROCEDURE — 92015 DETERMINE REFRACTIVE STATE: CPT | Mod: S$GLB,,, | Performed by: OPTOMETRIST

## 2018-06-26 PROCEDURE — 99999 PR PBB SHADOW E&M-EST. PATIENT-LVL II: CPT | Mod: PBBFAC,,, | Performed by: OPTOMETRIST

## 2018-06-26 PROCEDURE — 92014 COMPRE OPH EXAM EST PT 1/>: CPT | Mod: S$GLB,,, | Performed by: OPTOMETRIST

## 2018-06-26 RX ORDER — FLUCONAZOLE 150 MG/1
TABLET ORAL
Refills: 2 | COMMUNITY
Start: 2018-05-15

## 2018-06-26 RX ORDER — LEVOFLOXACIN 500 MG/1
TABLET, FILM COATED ORAL
Refills: 2 | COMMUNITY
Start: 2018-05-15

## 2018-06-26 RX ORDER — HYDROCODONE BITARTRATE AND ACETAMINOPHEN 7.5; 325 MG/1; MG/1
TABLET ORAL
Refills: 0 | COMMUNITY
Start: 2018-05-03

## 2018-06-26 RX ORDER — PREDNISOLONE SODIUM PHOSPHATE 15 MG/5ML
SOLUTION ORAL
Refills: 2 | COMMUNITY
Start: 2018-05-15

## 2018-06-26 RX ORDER — METHYLPREDNISOLONE 4 MG/1
TABLET ORAL
COMMUNITY
Start: 2018-04-02 | End: 2018-06-26

## 2018-06-26 NOTE — PROGRESS NOTES
HPI     DLS: 5/11/2017  Pt states near va has decreased with glasses.  +Dry eyes  +Itching   Denies f/f    Pt states tried CL in the past (air optix and acuvue--see notes) and   states eyes were always dry.    Refresh ou PRN---couple times a week     Last edited by Cm Lehman, OD on 6/26/2018  1:49 PM. (History)        ROS     Negative for: Constitutional, Gastrointestinal, Neurological, Skin,   Genitourinary, Musculoskeletal, HENT, Endocrine, Cardiovascular, Eyes,   Respiratory, Psychiatric, Allergic/Imm, Heme/Lymph    Last edited by Cm Lehman, OD on 6/26/2018  1:49 PM. (History)        Assessment /Plan     For exam results, see Encounter Report.    Nuclear sclerosis, bilateral    Presbyopia    Dry eyes, bilateral      1. Cat OU--pt wishes new Rx  2. Pt gave up on CLs--eyes too dry. Discussed need for daily ATs!    PLAN:    rtc 1 yr

## 2018-09-14 ENCOUNTER — PATIENT MESSAGE (OUTPATIENT)
Dept: OPTOMETRY | Facility: CLINIC | Age: 63
End: 2018-09-14

## 2018-11-08 RX ORDER — LOSARTAN POTASSIUM AND HYDROCHLOROTHIAZIDE 12.5; 5 MG/1; MG/1
TABLET ORAL
Qty: 90 TABLET | Refills: 0 | Status: SHIPPED | OUTPATIENT
Start: 2018-11-08 | End: 2019-01-26 | Stop reason: SDUPTHER

## 2019-01-26 RX ORDER — LOSARTAN POTASSIUM AND HYDROCHLOROTHIAZIDE 12.5; 5 MG/1; MG/1
TABLET ORAL
Qty: 90 TABLET | Refills: 3 | Status: SHIPPED | OUTPATIENT
Start: 2019-01-26 | End: 2019-12-03 | Stop reason: SDUPTHER

## 2019-09-13 ENCOUNTER — PATIENT MESSAGE (OUTPATIENT)
Dept: ADMINISTRATIVE | Facility: HOSPITAL | Age: 64
End: 2019-09-13

## 2019-11-01 ENCOUNTER — OFFICE VISIT (OUTPATIENT)
Dept: OPTOMETRY | Facility: CLINIC | Age: 64
End: 2019-11-01
Payer: COMMERCIAL

## 2019-11-01 DIAGNOSIS — H52.4 PRESBYOPIA: ICD-10-CM

## 2019-11-01 DIAGNOSIS — E11.9 TYPE 2 DIABETES MELLITUS WITHOUT RETINOPATHY: Primary | ICD-10-CM

## 2019-11-01 DIAGNOSIS — H25.13 NUCLEAR SCLEROSIS, BILATERAL: ICD-10-CM

## 2019-11-01 PROCEDURE — 92015 DETERMINE REFRACTIVE STATE: CPT | Mod: S$GLB,,, | Performed by: OPTOMETRIST

## 2019-11-01 PROCEDURE — 99999 PR PBB SHADOW E&M-EST. PATIENT-LVL II: ICD-10-PCS | Mod: PBBFAC,,, | Performed by: OPTOMETRIST

## 2019-11-01 PROCEDURE — 92014 PR EYE EXAM, EST PATIENT,COMPREHESV: ICD-10-PCS | Mod: S$GLB,,, | Performed by: OPTOMETRIST

## 2019-11-01 PROCEDURE — 99999 PR PBB SHADOW E&M-EST. PATIENT-LVL II: CPT | Mod: PBBFAC,,, | Performed by: OPTOMETRIST

## 2019-11-01 PROCEDURE — 92015 PR REFRACTION: ICD-10-PCS | Mod: S$GLB,,, | Performed by: OPTOMETRIST

## 2019-11-01 PROCEDURE — 92014 COMPRE OPH EXAM EST PT 1/>: CPT | Mod: S$GLB,,, | Performed by: OPTOMETRIST

## 2019-11-01 NOTE — PROGRESS NOTES
HPI     DLS;6/26/18  Pt states no VA problems, states she is pre diabetic. On a diet  No f/f  Refresh gtts  LBS: does not check   Hemoglobin A1C       Date                     Value               Ref Range             Status                01/18/2011               6.0                 4.0 - 6.2 %           Final                 11/18/2009               5.9                 4.0 - 6.2 %           Final                 05/02/2009               5.9                 4.0 - 6.2 %           Final                  Last edited by Cm Lehman, OD on 11/1/2019  2:33 PM. (History)        ROS     Positive for: Endocrine (DM)    Negative for: Constitutional, Gastrointestinal, Neurological, Skin,   Genitourinary, Musculoskeletal, HENT, Cardiovascular, Eyes, Respiratory,   Psychiatric, Allergic/Imm, Heme/Lymph    Last edited by Cm Lehman, OD on 11/1/2019  2:33 PM. (History)        Assessment /Plan     For exam results, see Encounter Report.    Type 2 diabetes mellitus without retinopathy    Nuclear sclerosis, bilateral    Presbyopia      1. Cat OU--pt happy w Rx  2. DM- WITHOUT RETINOPATHY.  Advised yearly DFE  3. YONI--pt to cont w Ats    PLAN:    rtc 1 yr

## 2019-12-03 RX ORDER — LOSARTAN POTASSIUM AND HYDROCHLOROTHIAZIDE 12.5; 5 MG/1; MG/1
TABLET ORAL
Qty: 90 TABLET | Refills: 0 | Status: SHIPPED | OUTPATIENT
Start: 2019-12-03 | End: 2020-02-11

## 2019-12-06 DIAGNOSIS — Z12.39 BREAST CANCER SCREENING: ICD-10-CM

## 2020-02-11 RX ORDER — LOSARTAN POTASSIUM AND HYDROCHLOROTHIAZIDE 12.5; 5 MG/1; MG/1
TABLET ORAL
Qty: 90 TABLET | Refills: 3 | Status: SHIPPED | OUTPATIENT
Start: 2020-02-11

## 2020-05-26 ENCOUNTER — OFFICE VISIT (OUTPATIENT)
Dept: OPTOMETRY | Facility: CLINIC | Age: 65
End: 2020-05-26
Payer: COMMERCIAL

## 2020-05-26 DIAGNOSIS — H00.022 HORDEOLUM INTERNUM OF RIGHT LOWER EYELID: Primary | ICD-10-CM

## 2020-05-26 PROCEDURE — 92012 PR EYE EXAM, EST PATIENT,INTERMED: ICD-10-PCS | Mod: S$GLB,,, | Performed by: OPTOMETRIST

## 2020-05-26 PROCEDURE — 99999 PR PBB SHADOW E&M-EST. PATIENT-LVL II: ICD-10-PCS | Mod: PBBFAC,,, | Performed by: OPTOMETRIST

## 2020-05-26 PROCEDURE — 92012 INTRM OPH EXAM EST PATIENT: CPT | Mod: S$GLB,,, | Performed by: OPTOMETRIST

## 2020-05-26 PROCEDURE — 99999 PR PBB SHADOW E&M-EST. PATIENT-LVL II: CPT | Mod: PBBFAC,,, | Performed by: OPTOMETRIST

## 2020-05-26 RX ORDER — ROSUVASTATIN CALCIUM 10 MG/1
TABLET, COATED ORAL
COMMUNITY
Start: 2020-05-16

## 2020-05-26 RX ORDER — LOSARTAN POTASSIUM AND HYDROCHLOROTHIAZIDE 12.5; 5 MG/1; MG/1
TABLET ORAL
COMMUNITY

## 2020-05-26 RX ORDER — TOBRAMYCIN AND DEXAMETHASONE 3; 1 MG/ML; MG/ML
1 SUSPENSION/ DROPS OPHTHALMIC 4 TIMES DAILY
Qty: 1 BOTTLE | Refills: 0 | Status: SHIPPED | OUTPATIENT
Start: 2020-05-26 | End: 2020-06-02

## 2020-05-26 RX ORDER — PHENTERMINE HYDROCHLORIDE 37.5 MG/1
TABLET ORAL
COMMUNITY
Start: 2020-05-13

## 2020-05-26 RX ORDER — METOPROLOL TARTRATE 50 MG/1
TABLET ORAL
COMMUNITY
Start: 2020-05-10

## 2020-05-26 NOTE — PROGRESS NOTES
HPI     DLS 11/01/2019   Patient presents  RLL swollen, Crusty lids, Pain, redness, with early   morning discharge. X 4 days.  Patient deny any problems in OS    Patient using refresh PRN    Last edited by Cm Lehman, OD on 5/26/2020  2:25 PM. (History)        ROS     Positive for: Endocrine (DM)    Negative for: Constitutional, Gastrointestinal, Neurological, Skin,   Genitourinary, Musculoskeletal, HENT, Cardiovascular, Eyes, Respiratory,   Psychiatric, Allergic/Imm, Heme/Lymph    Last edited by Cm Lehman, OD on 5/26/2020  2:32 PM. (History)        Assessment /Plan     For exam results, see Encounter Report.    Hordeolum internum of right lower eyelid  -     tobramycin-dexamethasone 0.3-0.1% (TOBRADEX) 0.3-0.1 % DrpS; Place 1 drop into the right eye 4 (four) times daily. for 7 days  Dispense: 1 Bottle; Refill: 0      See notes from Nov:  1. Cat OU--pt happy w Rx  2. DM- WITHOUT RETINOPATHY.  Advised yearly DFE  3. YONI--pt to cont w Ats    Pt presents TODAY w int lópez RLL    PLAN:    1) Hot compresses, 5 minutes at a time, QID, followed by Tobradex drops QID, for one week.  2) Discussed chalazion formation  3) Pt will call if not resolved in one week, and will schedule excision.  Otherwise rtc as sched Nov 2020 for full exam

## 2020-10-05 ENCOUNTER — PATIENT MESSAGE (OUTPATIENT)
Dept: ADMINISTRATIVE | Facility: HOSPITAL | Age: 65
End: 2020-10-05

## 2021-01-05 ENCOUNTER — PATIENT MESSAGE (OUTPATIENT)
Dept: ADMINISTRATIVE | Facility: HOSPITAL | Age: 66
End: 2021-01-05

## 2021-03-22 ENCOUNTER — TELEPHONE (OUTPATIENT)
Dept: OPTOMETRY | Facility: CLINIC | Age: 66
End: 2021-03-22

## 2022-07-26 ENCOUNTER — IMMUNIZATION (OUTPATIENT)
Dept: PRIMARY CARE CLINIC | Facility: CLINIC | Age: 67
End: 2022-07-26
Payer: COMMERCIAL

## 2022-07-26 DIAGNOSIS — Z23 NEED FOR VACCINATION: Primary | ICD-10-CM

## 2022-07-26 PROCEDURE — 91305 COVID-19, MRNA, LNP-S, PF, 30 MCG/0.3 ML DOSE VACCINE (PFIZER): CPT | Mod: PBBFAC | Performed by: INTERNAL MEDICINE

## 2024-11-27 ENCOUNTER — OFFICE VISIT (OUTPATIENT)
Dept: URGENT CARE | Facility: CLINIC | Age: 69
End: 2024-11-27
Payer: MEDICARE

## 2024-11-27 VITALS
HEIGHT: 64 IN | BODY MASS INDEX: 28.6 KG/M2 | DIASTOLIC BLOOD PRESSURE: 62 MMHG | TEMPERATURE: 98 F | SYSTOLIC BLOOD PRESSURE: 126 MMHG | HEART RATE: 61 BPM | OXYGEN SATURATION: 95 % | RESPIRATION RATE: 18 BRPM | WEIGHT: 167.56 LBS

## 2024-11-27 DIAGNOSIS — M21.611: ICD-10-CM

## 2024-11-27 DIAGNOSIS — S90.412A ABRASION OF LEFT GREAT TOE, INITIAL ENCOUNTER: ICD-10-CM

## 2024-11-27 DIAGNOSIS — M21.612 BUNION OF GREAT TOE OF LEFT FOOT: Primary | ICD-10-CM

## 2024-11-27 PROCEDURE — 99213 OFFICE O/P EST LOW 20 MIN: CPT | Mod: S$GLB,,, | Performed by: PHYSICIAN ASSISTANT

## 2024-11-27 RX ORDER — MUPIROCIN 20 MG/G
OINTMENT TOPICAL 2 TIMES DAILY
Qty: 22 G | Refills: 0 | Status: SHIPPED | OUTPATIENT
Start: 2024-11-27 | End: 2024-12-04

## 2024-11-27 NOTE — PATIENT INSTRUCTIONS
PLEASE READ YOUR DISCHARGE INSTRUCTIONS ENTIRELY AS IT CONTAINS IMPORTANT INFORMATION.    **Please cover your incision for 48-72 hours.  Then keep open to air.  Use the antibiotic ointment to the wound 2x to open wound as directed for 1-2 weeks.      Do not use any hydrogen peroxide or alcohol to clean as it will prevent wound healing.    Take OTC Tylenol or anti-inflammatory as needed for pain. If no contraindication.    May apply ice/warm compression to help with pain or swelling.     Please return or see your primary care doctor for signs of worsening infection (purulent drainage, fever, worsening swelling/redness/pain, inability to move your extremity).        Please arrange follow up with your primary medical clinic as soon as possible. You must understand that you've received an Urgent Care treatment only and that you may be released before all of your medical problems are known or treated. You, the patient, will arrange for follow up as instructed. If your symptoms worsen or fail to improve you should go to the Emergency Room.    WE CANNOT RULE OUT ALL POSSIBLE CAUSES OF YOUR SYMPTOMS IN THE URGENT CARE SETTING PLEASE GO TO THE ER IF YOU FEELS YOUR CONDITION IS WORSENING OR YOU WOULD LIKE EMERGENT EVALUATION.      Follow-up care  Follow up with your healthcare provider as advised. If you have stitches or staples, be sure to return as directed to have them removed.    When to seek medical advice  Call your healthcare provider right away if any of these occur:  Wound bleeding not controlled by direct pressure  Signs of infection, including increasing pain in the wound, increasing wound redness or swelling, or pus or bad odor coming from the wound  Fever of 100.4°F (38.ºC) or as directed by your health care provider  Wound edges re-open  Wound changes colors  Numbness or weakness in the affected extremity  Decreased movement of the extremity  Date Last Reviewed: 6/10/2015  © 1317-7789 [employer]  [address] [place] [address]. All rights reserved. This information is not intended as a substitute for professional medical care. Always follow your healthcare professional's instructions.

## 2024-11-27 NOTE — PROGRESS NOTES
"Subjective:      Patient ID: Sharmin lAaniz is a 69 y.o. female.    Vitals:  height is 5' 4" (1.626 m) and weight is 76 kg (167 lb 8.8 oz). Her oral temperature is 98 °F (36.7 °C). Her blood pressure is 126/62 and her pulse is 61. Her respiration is 18 and oxygen saturation is 95%.     Chief Complaint: Toe Pain    69-year-old female who presents to urgent care clinic for evaluation.  Pt states that she has mild pain and itching on on the side of her left big toe.Pt states that she noticed it yesterday.  She scratched it in his her sleep.  Not taking anything for symptoms since it is so mild.  Did try Neosporin.  She tried to schedule an appointment with a podiatrist outside facility that her  goes to but they are close for Thanksgiving.  She was going on a cruise in next few days and requesting evaluation.  No other associated symptoms.    Toe Pain   Incident onset: 1 day ago. The incident occurred at home. The injury mechanism is unknown. The pain is present in the left foot. The pain is at a severity of 2/10. The pain is mild. Pertinent negatives include no loss of sensation, numbness or tingling. Treatments tried: neospirin. The treatment provided no relief.       Constitution: Negative for activity change, chills, fatigue and fever.   HENT:  Negative for ear pain, hearing loss, congestion, postnasal drip, sinus pain, sinus pressure, sore throat and trouble swallowing.    Neck: Negative for neck pain, neck stiffness and painful lymph nodes.   Cardiovascular:  Negative for chest pain, leg swelling, palpitations and sob on exertion.   Eyes:  Negative for eye discharge, eye pain and vision loss.   Respiratory:  Negative for chest tightness, cough, shortness of breath, wheezing and asthma.    Gastrointestinal:  Negative for abdominal pain, nausea, vomiting, rectal bleeding and bowel incontinence.   Genitourinary:  Negative for dysuria, frequency, urgency, urine decreased, flank pain, bladder incontinence, " hematuria, history of kidney stones, vaginal pain and vaginal discharge.   Musculoskeletal:  Positive for joint pain. Negative for trauma, joint swelling, muscle cramps and muscle ache.   Skin:  Positive for abrasion. Negative for color change, pale, rash and wound.   Allergic/Immunologic: Negative for eczema, asthma and immunocompromised state.   Neurological:  Negative for dizziness, light-headedness, passing out, facial drooping, speech difficulty, coordination disturbances, loss of balance, headaches, disorientation, altered mental status, numbness, tingling and seizures.   Hematologic/Lymphatic: Negative for swollen lymph nodes and easy bruising/bleeding. Does not bruise/bleed easily.   Psychiatric/Behavioral:  Negative for altered mental status and disorientation.       Objective:     Physical Exam   Constitutional: She appears well-developed. She is cooperative.  Non-toxic appearance. She does not appear ill. No distress.   HENT:   Head: Normocephalic and atraumatic.   Ears:   Right Ear: Hearing, external ear and ear canal normal.   Left Ear: Hearing, external ear and ear canal normal.   Nose: Nose normal.   Mouth/Throat: Oropharynx is clear and moist. Mucous membranes are moist. Oropharynx is clear.   Eyes: Conjunctivae, EOM and lids are normal. Right eye exhibits no discharge. Left eye exhibits no discharge. vision grossly intact gaze aligned appropriately   Neck: Neck supple. No neck rigidity present.   Cardiovascular: Normal rate, regular rhythm and normal pulses.   Pulmonary/Chest: Effort normal. No accessory muscle usage. No respiratory distress. She has no wheezes. She exhibits no tenderness.   Abdominal: Normal appearance. She exhibits no distension.   Musculoskeletal: Normal range of motion.         General: No tenderness. Normal range of motion.      Right lower leg: No edema.      Left lower leg: No edema.      Comments: Moves all extremities with normal tone, strength, and ROM.  Gait  normal    Bilateral great toe hallux valgus.  Left great toe MCP with superficial 2 mm abrasion with no tenderness, drainage, erythema, or warmth.   Neurological: no focal deficit. She is alert and at baseline. She has normal motor skills and normal sensation. She displays no weakness, facial symmetry and normal reflexes. No cranial nerve deficit or sensory deficit. She exhibits normal muscle tone. Gait and coordination normal. Coordination normal.   Skin: Skin is warm, dry, not diaphoretic and no rash. Capillary refill takes less than 2 seconds. No lesion   Psychiatric: Her behavior is normal. Mood and thought content normal.   Nursing note and vitals reviewed.      Assessment:     1. Bunion of great toe of left foot    2. Abrasion of left great toe, initial encounter    3. Asymptomatic bunion of right great toe      Note dictated with voice recognition software, please excuse any grammatical errors.    Patient presents with clinical exam findings and history consistent with above.  We discussed the differential diagnosis.    On exam, patient is nontoxic appearing and vitals are stable.  Patient is essentially neurovascularly intact on exam.      Diagnostic testing results were independently reviewed and interpreted, which were discussed in depth with patient.   Test ordered in clinic:  none  Additionally, previous progress notes/admissions/lab were reviewed and interpreted.  CMP outside facility 04/25/2023 with [facility] function and EGFR.      Plan:     Note dictated with voice recognition software, please excuse any grammatical errors.    Patient was prescribed meds and recommended OTC treatments for their symptoms. Patient was treated conservatively with activity modification, OTC pain reliever, muscle stretches, and Warm vs. RICE therapy.  She already has appointments set up with outside facility Podiatry for when she returns home from her vacation.  If symptoms do not improve/worsens, patient was referred  back to PCP for continued outpatient workup and management.     Patient was instructed to return for re-evaluation for any worsening or change in current symptoms. Strict ED versus clinic precautions given in depth. Discharge and follow-up instructions given verbally/printed with the patient who expressed understanding and willingness to comply with my recommendations.  Patient verbalized understanding and agreed with the entirety of plan of care.    Bunion of great toe of left foot    Abrasion of left great toe, initial encounter  -     mupirocin (BACTROBAN) 2 % ointment; Apply topically 2 (two) times daily. for 7 days  Dispense: 22 g; Refill: 0    Asymptomatic bunion of right great toe